# Patient Record
Sex: FEMALE | URBAN - METROPOLITAN AREA
[De-identification: names, ages, dates, MRNs, and addresses within clinical notes are randomized per-mention and may not be internally consistent; named-entity substitution may affect disease eponyms.]

---

## 2021-11-29 ENCOUNTER — OFFICE VISIT (OUTPATIENT)
Dept: URBAN - METROPOLITAN AREA CLINIC 92 | Facility: CLINIC | Age: 21
End: 2021-11-29

## 2022-01-11 ENCOUNTER — WEB ENCOUNTER (OUTPATIENT)
Dept: URBAN - METROPOLITAN AREA CLINIC 92 | Facility: CLINIC | Age: 22
End: 2022-01-11

## 2022-01-11 ENCOUNTER — OFFICE VISIT (OUTPATIENT)
Dept: URBAN - METROPOLITAN AREA CLINIC 92 | Facility: CLINIC | Age: 22
End: 2022-01-11
Payer: COMMERCIAL

## 2022-01-11 DIAGNOSIS — R11.0 NAUSEA: ICD-10-CM

## 2022-01-11 DIAGNOSIS — K21.9 GERD WITHOUT ESOPHAGITIS: ICD-10-CM

## 2022-01-11 DIAGNOSIS — E46 MALNUTRITION, UNSPECIFIED TYPE: ICD-10-CM

## 2022-01-11 PROBLEM — 266435005: Status: ACTIVE | Noted: 2022-01-11

## 2022-01-11 PROCEDURE — 99203 OFFICE O/P NEW LOW 30 MIN: CPT | Performed by: INTERNAL MEDICINE

## 2022-01-11 NOTE — HPI-TODAY'S VISIT:
This leni a 22 yo female here for evaluation of abdominal pain.  She went to the ER in [facility] where she was visiting her grandmother.  At the time she was experiencing severe abdominal pain.  TUMS moderately helped the pain.  This was associated with a single episode of vomiting.  A CT scan demonstrated a ruptured ovarian cyst.  She was treated with Zofran and Phenergan then discharged.  She has not had the severe pain since. She is not sure if stress was a contributing factor.  Two days after the ER visit she relocated to [facility] - living on her own for the first time.  Between August and November she had episodes of severe nausea releived with TUMS.  She has been asymptomatic for 4 weeks.

## 2022-01-13 ENCOUNTER — TELEPHONE ENCOUNTER (OUTPATIENT)
Dept: URBAN - METROPOLITAN AREA CLINIC 92 | Facility: CLINIC | Age: 22
End: 2022-01-13

## 2022-01-13 LAB
A/G RATIO: 1.6
ALBUMIN: 4.5
ALKALINE PHOSPHATASE: 72
ALT (SGPT): 21
AST (SGOT): 13
BILIRUBIN, TOTAL: 0.3
BUN/CREATININE RATIO: 11
BUN: 8
CALCIUM: 9.8
CARBON DIOXIDE, TOTAL: 23
CHLORIDE: 105
CREATININE: 0.76
DEAMIDATED GLIADIN ABS, IGA: 5
DEAMIDATED GLIADIN ABS, IGG: 3
EGFR IF AFRICN AM: 130
EGFR IF NONAFRICN AM: 112
ENDOMYSIAL ANTIBODY IGA: NEGATIVE
FOLATE (FOLIC ACID), SERUM: >20
GLOBULIN, TOTAL: 2.8
GLUCOSE: 69
HEMATOCRIT: 39.1
HEMOGLOBIN: 13.4
IMMUNOGLOBULIN A, QN, SERUM: 85
MCH: 32.6
MCHC: 34.3
MCV: 95
NRBC: (no result)
PLATELETS: 230
POTASSIUM: 4.2
PROTEIN, TOTAL: 7.3
RBC: 4.11
RDW: 12.6
SODIUM: 140
T-TRANSGLUTAMINASE (TTG) IGA: <2
T-TRANSGLUTAMINASE (TTG) IGG: <2
VITAMIN B12: 1556
VITAMIN D, 25-HYDROXY: 43
WBC: 5.6

## 2022-02-16 ENCOUNTER — DASHBOARD ENCOUNTERS (OUTPATIENT)
Age: 22
End: 2022-02-16

## 2022-02-21 ENCOUNTER — OFFICE VISIT (OUTPATIENT)
Dept: URBAN - METROPOLITAN AREA TELEHEALTH 2 | Facility: TELEHEALTH | Age: 22
End: 2022-02-21
Payer: COMMERCIAL

## 2022-02-21 DIAGNOSIS — R63.6 UNDERWEIGHT: ICD-10-CM

## 2022-02-21 DIAGNOSIS — K59.01 CONSTIPATION BY DELAYED COLONIC TRANSIT: ICD-10-CM

## 2022-02-21 DIAGNOSIS — R10.84 GENERALIZED ABDOMINAL PAIN: ICD-10-CM

## 2022-02-21 PROBLEM — 65404009: Status: ACTIVE | Noted: 2022-01-11

## 2022-02-21 PROBLEM — 35298007: Status: ACTIVE | Noted: 2022-02-21

## 2022-02-21 PROCEDURE — 99213 OFFICE O/P EST LOW 20 MIN: CPT | Performed by: INTERNAL MEDICINE

## 2022-02-21 NOTE — HPI-OTHER HISTORIES
January 2022 This leni a 22 yo female here for evaluation of abdominal pain.  She went to the ER in [facility] where she was visiting her grandmother.  At the time she was experiencing severe abdominal pain.  TUMS moderately helped the pain.  This was associated with a single episode of vomiting.  A CT scan demonstrated a ruptured ovarian cyst.  She was treated with Zofran and Phenergan then discharged.  She has not had the severe pain since. She is not sure if stress was a contributing factor.  Two days after the ER visit she relocated to [facility] - living on her own for the first time.  Between August and November she had episodes of severe nausea releived with TUMS.  She has been asymptomatic for 4 weeks.

## 2022-02-21 NOTE — HPI-TODAY'S VISIT:
This leni a 20 yo female here for f/u of abdominal pain.  Labs were negative for Celiac disease.   She adheres to a gluten free diet.  CBC and CMP were unremarkable.  She has had occasional, very mild abdominal discomfort relieved with a bowel movement.  She also has had her menses in 2 months and there is no risk of pregnancy.  Since relocating to [place] August 2021 she has not followed up with a GYN.